# Patient Record
Sex: MALE | Race: WHITE | NOT HISPANIC OR LATINO | Employment: UNEMPLOYED | URBAN - METROPOLITAN AREA
[De-identification: names, ages, dates, MRNs, and addresses within clinical notes are randomized per-mention and may not be internally consistent; named-entity substitution may affect disease eponyms.]

---

## 2023-02-09 DIAGNOSIS — F84.0 AUTISTIC DISORDER, RESIDUAL STATE: Primary | ICD-10-CM

## 2023-02-10 ENCOUNTER — LAB (OUTPATIENT)
Dept: LAB | Facility: CLINIC | Age: 23
End: 2023-02-10
Payer: MEDICAID

## 2023-02-10 DIAGNOSIS — F84.0 AUTISTIC DISORDER, RESIDUAL STATE: ICD-10-CM

## 2023-02-10 PROCEDURE — 99000 SPECIMEN HANDLING OFFICE-LAB: CPT

## 2023-02-10 PROCEDURE — 36415 COLL VENOUS BLD VENIPUNCTURE: CPT

## 2023-02-10 PROCEDURE — 80164 ASSAY DIPROPYLACETIC ACD TOT: CPT | Mod: 90

## 2023-02-12 LAB
VALPROATE FREE MFR SERPL: 10 %
VALPROATE FREE SERPL-MCNC: 8 UG/ML
VALPROATE SERPL-MCNC: 82 UG/ML

## 2023-07-06 ENCOUNTER — HOSPITAL ENCOUNTER (EMERGENCY)
Facility: CLINIC | Age: 23
Discharge: HOME | End: 2023-07-06
Attending: EMERGENCY MEDICINE | Admitting: EMERGENCY MEDICINE
Payer: MEDICAID

## 2023-07-06 VITALS
HEART RATE: 56 BPM | OXYGEN SATURATION: 99 % | TEMPERATURE: 97.9 F | SYSTOLIC BLOOD PRESSURE: 135 MMHG | RESPIRATION RATE: 19 BRPM | DIASTOLIC BLOOD PRESSURE: 75 MMHG

## 2023-07-06 DIAGNOSIS — R46.89 AGGRESSIVE BEHAVIOR: ICD-10-CM

## 2023-07-06 PROCEDURE — 90791 PSYCH DIAGNOSTIC EVALUATION: CPT

## 2023-07-06 PROCEDURE — 99285 EMERGENCY DEPT VISIT HI MDM: CPT | Mod: 25

## 2023-07-06 ASSESSMENT — COLUMBIA-SUICIDE SEVERITY RATING SCALE - C-SSRS
TOTAL  NUMBER OF INTERRUPTED ATTEMPTS LIFETIME: NO
2. HAVE YOU ACTUALLY HAD ANY THOUGHTS OF KILLING YOURSELF?: NO
TOTAL  NUMBER OF ABORTED OR SELF INTERRUPTED ATTEMPTS LIFETIME: NO
ATTEMPT LIFETIME: NO
6. HAVE YOU EVER DONE ANYTHING, STARTED TO DO ANYTHING, OR PREPARED TO DO ANYTHING TO END YOUR LIFE?: NO
1. HAVE YOU WISHED YOU WERE DEAD OR WISHED YOU COULD GO TO SLEEP AND NOT WAKE UP?: NO

## 2023-07-06 ASSESSMENT — ACTIVITIES OF DAILY LIVING (ADL)
ADLS_ACUITY_SCORE: 35
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
## 2023-07-06 NOTE — ED NOTES
Writer RN called and spoke with staff member [place] at group [place] to give update.   Staff member reports also not being able to get a hold of the patients mother.
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
## 2023-07-06 NOTE — ED NOTES
Writer RN attempted to talk with pts mother who is his guardian ([phone]) and was not able to get a hold of her.   Writeeloisa GALINDO left a message.

## 2023-07-07 NOTE — ED NOTES
Spoke with pt's mother and group home staff which are all in agreement with the pt returning to the . Awaiting call back to see if they are able to pick the pt up.
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
## 2023-07-12 ENCOUNTER — HOSPITAL ENCOUNTER (EMERGENCY)
Facility: CLINIC | Age: 23
Discharge: LEFT BEFORE DISCHARGE | End: 2023-07-12
Admitting: EMERGENCY MEDICINE
Payer: MEDICAID

## 2023-07-12 VITALS
HEART RATE: 65 BPM | TEMPERATURE: 97.9 F | SYSTOLIC BLOOD PRESSURE: 115 MMHG | OXYGEN SATURATION: 98 % | DIASTOLIC BLOOD PRESSURE: 80 MMHG | RESPIRATION RATE: 18 BRPM

## 2023-07-12 PROCEDURE — 99281 EMR DPT VST MAYX REQ PHY/QHP: CPT

## 2023-07-12 NOTE — ED TRIAGE NOTES
Headache, back pain, bilateral ear pain, rectum pain, abd pain and pimple on right arm. Started yesterday. Anxiety is acting up per group home member which makes him a hypochondriac. Pt took PRN anxiety med before coming to ER.

## 2023-07-14 DIAGNOSIS — F84.0 AUTISTIC DISORDER: Primary | ICD-10-CM

## 2023-07-17 ENCOUNTER — LAB (OUTPATIENT)
Dept: LAB | Facility: CLINIC | Age: 23
End: 2023-07-17
Payer: MEDICAID

## 2023-07-17 DIAGNOSIS — F84.0 AUTISTIC DISORDER: ICD-10-CM

## 2023-07-17 PROCEDURE — 80165 DIPROPYLACETIC ACID FREE: CPT | Mod: 90

## 2023-07-17 PROCEDURE — 36415 COLL VENOUS BLD VENIPUNCTURE: CPT

## 2023-07-17 PROCEDURE — 80164 ASSAY DIPROPYLACETIC ACD TOT: CPT | Mod: 90

## 2023-07-17 PROCEDURE — 99000 SPECIMEN HANDLING OFFICE-LAB: CPT

## 2023-07-18 LAB
VALPROATE FREE MFR SERPL: 11 %
VALPROATE FREE SERPL-MCNC: 10 UG/ML
VALPROATE SERPL-MCNC: 92 UG/ML

## 2024-03-13 ENCOUNTER — OFFICE VISIT (OUTPATIENT)
Dept: FAMILY MEDICINE | Facility: CLINIC | Age: 24
End: 2024-03-13
Payer: MEDICAID

## 2024-03-13 VITALS
HEART RATE: 80 BPM | OXYGEN SATURATION: 96 % | BODY MASS INDEX: 21.3 KG/M2 | WEIGHT: 148.8 LBS | HEIGHT: 70 IN | SYSTOLIC BLOOD PRESSURE: 120 MMHG | TEMPERATURE: 97.4 F | DIASTOLIC BLOOD PRESSURE: 91 MMHG

## 2024-03-13 DIAGNOSIS — R53.83 OTHER FATIGUE: ICD-10-CM

## 2024-03-13 DIAGNOSIS — R63.0 DECREASED APPETITE: Primary | ICD-10-CM

## 2024-03-13 PROCEDURE — 99204 OFFICE O/P NEW MOD 45 MIN: CPT

## 2024-03-13 RX ORDER — DIVALPROEX SODIUM 500 MG/1
1000 TABLET, EXTENDED RELEASE ORAL
COMMUNITY
Start: 2022-12-29

## 2024-03-13 RX ORDER — OLANZAPINE 10 MG/1
TABLET ORAL
COMMUNITY
Start: 2024-02-23

## 2024-03-13 RX ORDER — HYDROXYZINE HYDROCHLORIDE 50 MG/1
50 TABLET, FILM COATED ORAL
COMMUNITY

## 2024-03-13 RX ORDER — CHOLECALCIFEROL (VITAMIN D3) 50 MCG
TABLET ORAL
COMMUNITY
Start: 2023-06-08

## 2024-03-13 RX ORDER — CHOLECALCIFEROL (VITAMIN D3) 50 MCG
TABLET ORAL
Status: CANCELLED | OUTPATIENT
Start: 2024-03-13

## 2024-03-13 SDOH — HEALTH STABILITY: PHYSICAL HEALTH: ON AVERAGE, HOW MANY DAYS PER WEEK DO YOU ENGAGE IN MODERATE TO STRENUOUS EXERCISE (LIKE A BRISK WALK)?: 0 DAYS

## 2024-03-13 ASSESSMENT — SOCIAL DETERMINANTS OF HEALTH (SDOH): HOW OFTEN DO YOU GET TOGETHER WITH FRIENDS OR RELATIVES?: ONCE A WEEK

## 2024-03-13 NOTE — PROGRESS NOTES
[facility]  Mellisa Hardin, OBDULIO CNP, Nurse Practitioner Primary Care  Mar 13, 2024      Preventative visit was not completed today due to patients acute nature.     Assessment & Plan     Decreased appetite  Patient was advised to go to ER. Reports decreased appetite and increased fatigue within the last couple of days. Was seen in urgent care on Monday for possible UTI and platelets 47 with slight increase in AST. Abdominal x-ray indicated moderate stool. Patient appears to be lethargic and stumbled while getting on exam table, abdomen tender throughout although difficult to assess due to mental capacity. Unable to rule out cholecystitis or appendicitis. Because of this, patient was advised to go to ER for further evaluation.      Other fatigue                Subjective   Fidencio is a 23 year old, presenting for the following:  Physical and Patient Request (Poor appetite)        3/13/2024     7:29 AM   Additional Questions   Roomed by delmi burns   Accompanied by [facility]- care taker for group home        Health Care Directive  Patient does not have a Health Care Directive or Living Will: Discussed advance care planning with patient; however, patient declined at this time.    HPI  Hasn't taken trazodone the last two nights, has woken up a couple of times in the night. Has been on trazadone for a number of months, increased shaking with increased dose but shaking has improved with decrease in trazodone.      Has been taking antibiotics Monday night, will take for the last couple of days. Has a little more energy since urgent care visit but continues to be lethargic. Gagging started a little over a week ago and has significantly decreased appetite. Shaking has reduced since trazedone reduced.     ED/UC Followup:    Facility:  [facility]  Date of visit: 3/11/24  Reason for visit: loss of appetite, fatigue, shaking  Current Status: still not eating well, shaking has reduced.  "    Reviewed and updated as needed this visit by Provider                             Objective    Exam  BP (!) 120/91 (BP Location: Right arm, Patient Position: Sitting, Cuff Size: Adult Regular)   Pulse 80   Temp 97.4  F (36.3  C) (Temporal)   Ht 1.778 m (5' 10\")   Wt 67.5 kg (148 lb 12.8 oz)   SpO2 96%   BMI 21.35 kg/m     Estimated body mass index is 21.35 kg/m  as calculated from the following:    Height as of this encounter: 1.778 m (5' 10\").    Weight as of this encounter: 67.5 kg (148 lb 12.8 oz).    Physical Exam  GENERAL: alert and fatigued  EYES: PERRL and slight yellowing of sclera   RESP: lungs clear to auscultation - no rales, rhonchi or wheezes  CV: regular rate and rhythm, normal S1 S2, no S3 or S4, no murmur, click or rub, no peripheral edema  ABDOMEN: tenderness throughout and bowel sounds normal  NEURO: Normal strength and tone, mentation intact and speech normal  PSYCH: mentation appears normal and fatigued        Signed Electronically by: OBDULIO Cameron CNP    "

## 2024-03-13 NOTE — COMMUNITY RESOURCES LIST (ENGLISH)
March 13, 2024           YOUR PERSONALIZED LIST OF SERVICES & PROGRAMS           & RECREATION    Sports      [employer] [place] - Sports clubs and recreational activities  [address] [place] [address] (Distance: 1.4 miles)  Phone: [phone]  Website: [email or web address]  Language: English  Fee: Self pay  Accessibility: [place] accessible      Park [employer] - Sports clubs and recreational activities - [place] [employer] [place]  [address] [place] [address] (Distance: 2.4 miles)  Phone: [phone]  Language: English  Fee: Free, Self pay  Accessibility: [place] accessible      [employer] BASEBALL AND SOFTBALL  Website: [email or web address].[email or web address]    Classes/Groups      Park & Recreation Board - Gym or workout facility - [place] [employer] [facility] [place]  [address] [place] [address] (Distance: 9.1 miles)  Language: English  Fee: Free, Self pay  Accessibility: [place] accessible      Park & Recreation Board - Gym or workout facility - [place] [employer] [facility] [place]  [address] [place] [address] (Distance: 11.9 miles)  Phone: [phone]  Language: English, Fijian  Fee: Free, Self pay  Accessibility: Translation services      Workouts - Exercise Class/At Home Workouts  Website: https://homeworkouts.org/  Language: English               IMPORTANT NUMBERS & WEBSITES        Emergency Services  911  .   United Way  211 http://211unitedway.org  .   Poison Control  (916) 747-9265 http://[place]poison.org http://[place]poison.org  .     Suicide and Crisis Lifeline  988 http://988lifeline.org  .   Childhelp National Child Abuse Hotline  197.649.2518 http://Childhelphotline.org   .   National Sexual Assault Hotline  (288) 166-7618 (HOPE) http://Rainn.org   .     National Runaway  Safeline  (535) 275-6099 (RUNAWAY) http://[employer] Laboratoriesru"[employer].org  .   Pregnancy & Postpartum Support  Call/text 806-676-8051  [place]: http://ppsupportmn.org  [place]: http://psichapters.com/wi  .   Substance Abuse National Helpline ([facility])  425-740-HELP (4000) http://Findtreatment.gov   .                DISCLAIMER: [employer] does not endorse any service providers mentioned in this resource list. [employer] does not guarantee that the services mentioned in this resource list will be available to you or will improve your health or wellness.    [facility]

## 2024-10-04 DIAGNOSIS — F84.0 AUTISTIC DISORDER: Primary | ICD-10-CM

## 2025-07-02 ENCOUNTER — OFFICE VISIT (OUTPATIENT)
Dept: FAMILY MEDICINE | Facility: CLINIC | Age: 25
End: 2025-07-02
Payer: MEDICAID

## 2025-07-02 VITALS
RESPIRATION RATE: 18 BRPM | DIASTOLIC BLOOD PRESSURE: 92 MMHG | OXYGEN SATURATION: 97 % | HEART RATE: 66 BPM | SYSTOLIC BLOOD PRESSURE: 126 MMHG | WEIGHT: 166.6 LBS | HEIGHT: 70 IN | BODY MASS INDEX: 23.85 KG/M2

## 2025-07-02 DIAGNOSIS — Z13.29 SCREENING FOR ENDOCRINE, METABOLIC, AND IMMUNITY DISORDER: ICD-10-CM

## 2025-07-02 DIAGNOSIS — Z13.0 SCREENING FOR ENDOCRINE, METABOLIC, AND IMMUNITY DISORDER: ICD-10-CM

## 2025-07-02 DIAGNOSIS — F84.0 AUTISTIC DISORDER, RESIDUAL STATE: ICD-10-CM

## 2025-07-02 DIAGNOSIS — Z00.00 ROUTINE GENERAL MEDICAL EXAMINATION AT A HEALTH CARE FACILITY: Primary | ICD-10-CM

## 2025-07-02 DIAGNOSIS — R03.0 ELEVATED BP WITHOUT DIAGNOSIS OF HYPERTENSION: ICD-10-CM

## 2025-07-02 DIAGNOSIS — Z13.220 SCREENING FOR HYPERLIPIDEMIA: ICD-10-CM

## 2025-07-02 DIAGNOSIS — L29.9 EAR ITCH: ICD-10-CM

## 2025-07-02 DIAGNOSIS — F84.0 AUTISM DISORDER: ICD-10-CM

## 2025-07-02 DIAGNOSIS — Z13.228 SCREENING FOR ENDOCRINE, METABOLIC, AND IMMUNITY DISORDER: ICD-10-CM

## 2025-07-02 DIAGNOSIS — R09.89 RUNNY NOSE: ICD-10-CM

## 2025-07-02 LAB
ALBUMIN SERPL BCG-MCNC: 4.4 G/DL (ref 3.5–5.2)
ALP SERPL-CCNC: 70 U/L (ref 40–150)
ALT SERPL W P-5'-P-CCNC: 81 U/L (ref 0–70)
ANION GAP SERPL CALCULATED.3IONS-SCNC: 12 MMOL/L (ref 7–15)
AST SERPL W P-5'-P-CCNC: 72 U/L (ref 0–45)
BILIRUB SERPL-MCNC: 0.8 MG/DL
BUN SERPL-MCNC: 16.2 MG/DL (ref 6–20)
CALCIUM SERPL-MCNC: 9.9 MG/DL (ref 8.8–10.4)
CHLORIDE SERPL-SCNC: 99 MMOL/L (ref 98–107)
CHOLEST SERPL-MCNC: 197 MG/DL
CREAT SERPL-MCNC: 1.2 MG/DL (ref 0.67–1.17)
EGFRCR SERPLBLD CKD-EPI 2021: 86 ML/MIN/1.73M2
ERYTHROCYTE [DISTWIDTH] IN BLOOD BY AUTOMATED COUNT: 13.1 % (ref 10–15)
EST. AVERAGE GLUCOSE BLD GHB EST-MCNC: 97 MG/DL
FASTING STATUS PATIENT QL REPORTED: ABNORMAL
FASTING STATUS PATIENT QL REPORTED: ABNORMAL
GLUCOSE SERPL-MCNC: 79 MG/DL (ref 70–99)
HBA1C MFR BLD: 5 % (ref 0–5.6)
HCO3 SERPL-SCNC: 27 MMOL/L (ref 22–29)
HCT VFR BLD AUTO: 42.3 % (ref 40–53)
HDLC SERPL-MCNC: 47 MG/DL
HGB BLD-MCNC: 14.8 G/DL (ref 13.3–17.7)
LDLC SERPL CALC-MCNC: 124 MG/DL
MCH RBC QN AUTO: 30.1 PG (ref 26.5–33)
MCHC RBC AUTO-ENTMCNC: 35 G/DL (ref 31.5–36.5)
MCV RBC AUTO: 86 FL (ref 78–100)
NONHDLC SERPL-MCNC: 150 MG/DL
PLATELET # BLD AUTO: 165 10E3/UL (ref 150–450)
POTASSIUM SERPL-SCNC: 3.6 MMOL/L (ref 3.4–5.3)
PROT SERPL-MCNC: 7.2 G/DL (ref 6.4–8.3)
RBC # BLD AUTO: 4.91 10E6/UL (ref 4.4–5.9)
SODIUM SERPL-SCNC: 138 MMOL/L (ref 135–145)
TRIGL SERPL-MCNC: 132 MG/DL
TSH SERPL DL<=0.005 MIU/L-ACNC: 3.35 UIU/ML (ref 0.3–4.2)
VALPROATE SERPL-MCNC: 84.9 UG/ML (ref 50–100)
VIT D+METAB SERPL-MCNC: 37 NG/ML (ref 20–50)
WBC # BLD AUTO: 6.2 10E3/UL (ref 4–11)

## 2025-07-02 PROCEDURE — 80053 COMPREHEN METABOLIC PANEL: CPT | Performed by: FAMILY MEDICINE

## 2025-07-02 PROCEDURE — 3044F HG A1C LEVEL LT 7.0%: CPT | Performed by: FAMILY MEDICINE

## 2025-07-02 PROCEDURE — 82306 VITAMIN D 25 HYDROXY: CPT | Performed by: FAMILY MEDICINE

## 2025-07-02 PROCEDURE — 84443 ASSAY THYROID STIM HORMONE: CPT | Performed by: FAMILY MEDICINE

## 2025-07-02 PROCEDURE — 3074F SYST BP LT 130 MM HG: CPT | Performed by: FAMILY MEDICINE

## 2025-07-02 PROCEDURE — 99395 PREV VISIT EST AGE 18-39: CPT | Performed by: FAMILY MEDICINE

## 2025-07-02 PROCEDURE — 3080F DIAST BP >= 90 MM HG: CPT | Performed by: FAMILY MEDICINE

## 2025-07-02 PROCEDURE — 36415 COLL VENOUS BLD VENIPUNCTURE: CPT | Performed by: FAMILY MEDICINE

## 2025-07-02 PROCEDURE — 99213 OFFICE O/P EST LOW 20 MIN: CPT | Mod: 25 | Performed by: FAMILY MEDICINE

## 2025-07-02 PROCEDURE — 80164 ASSAY DIPROPYLACETIC ACD TOT: CPT | Performed by: FAMILY MEDICINE

## 2025-07-02 PROCEDURE — 80061 LIPID PANEL: CPT | Performed by: FAMILY MEDICINE

## 2025-07-02 PROCEDURE — 85027 COMPLETE CBC AUTOMATED: CPT | Performed by: FAMILY MEDICINE

## 2025-07-02 PROCEDURE — 83036 HEMOGLOBIN GLYCOSYLATED A1C: CPT | Performed by: FAMILY MEDICINE

## 2025-07-02 RX ORDER — GUANFACINE 1 MG/1
1 TABLET ORAL 3 TIMES DAILY
COMMUNITY
Start: 2025-06-12

## 2025-07-02 SDOH — HEALTH STABILITY: PHYSICAL HEALTH: ON AVERAGE, HOW MANY DAYS PER WEEK DO YOU ENGAGE IN MODERATE TO STRENUOUS EXERCISE (LIKE A BRISK WALK)?: 1 DAY

## 2025-07-02 ASSESSMENT — SOCIAL DETERMINANTS OF HEALTH (SDOH): HOW OFTEN DO YOU GET TOGETHER WITH FRIENDS OR RELATIVES?: ONCE A WEEK

## 2025-07-02 NOTE — LETTER
July 2, 2025      Fidencio Vergara  [address] [place] [address] [place] [address]        To Whom It May Concern:    Fidencio Vergara was seen in our clinic today 07/02/25 for an annual physical. He is healthy and has no restriction and may participate in camp       Sincerely,      Danii Cleveland MD    Electronically signed

## 2025-07-02 NOTE — PROGRESS NOTES
Preventive Care Visit  [facility]  Danii Cleveland MD, Family Medicine  Jul 2, 2025      Assessment & Plan     Routine general medical examination at a health care facility  Routine history and physical, updated in EMR.  Lab work obtained today.  Follow-up yearly for annual physical    Autism disorder  Medications per psychology.  Stable.  Lives in a group home  - Comprehensive metabolic panel (BMP + Alb, Alk Phos, ALT, AST, Total. Bili, TP)  - TSH with free T4 reflex  - Vitamin D Deficiency  - Comprehensive metabolic panel (BMP + Alb, Alk Phos, ALT, AST, Total. Bili, TP)  - TSH with free T4 reflex  - Vitamin D Deficiency    Ear itch  Runny nose  Exam unremarkable.  Recommended over-the-counter Zyrtec 10 mg daily.  Follow-up as needed    Screening for hyperlipidemia  - Lipid panel reflex to direct LDL Fasting  - Lipid panel reflex to direct LDL Fasting    Screening for endocrine, metabolic, and immunity disorder  - Comprehensive metabolic panel (BMP + Alb, Alk Phos, ALT, AST, Total. Bili, TP)  - CBC with platelets  - Hemoglobin A1c  - TSH with free T4 reflex  - Vitamin D Deficiency  - Comprehensive metabolic panel (BMP + Alb, Alk Phos, ALT, AST, Total. Bili, TP)  - CBC with platelets  - Hemoglobin A1c  - TSH with free T4 reflex  - Vitamin D Deficiency    Autistic disorder, residual state  Stable.  - Valproic acid    Elevated BP without diagnosis of hypertension  Advised checking BP daily for 2 weeks and let me know.   Advised decreased salt in diet and increase in physical activities       Patient has been advised of split billing requirements and indicates understanding: Yes      Reviewed preventive health counseling, as reflected in patient instructions  Counseling  Appropriate preventive services were addressed with this patient via screening, questionnaire, or discussion as appropriate for fall prevention, nutrition, physical activity, Tobacco-use cessation, social engagement,  weight loss and cognition.  Checklist reviewing preventive services available has been given to the patient.  Reviewed patient's diet, addressing concerns and/or questions.   He is at risk for lack of exercise and has been provided with information to increase physical activity for the benefit of his well-being.   The patient was instructed to see the dentist every 6 months.         Subjective   Fidencio is a 24 year old, presenting for the following:  Physical (Pt is fasting today, mentioned that bilateral ears have been bothersome for about a week. )        7/2/2025     7:17 AM   Additional Questions   Roomed by Katy Short CMA   Accompanied by Caregiver          HPI  Chief Complaint   Patient presents with    Physical     Pt is fasting today, mentioned that bilateral ears have been bothersome for about a week.      Seeing psychiatry at [facility] for all his meds.  Going to [employer]         Advance Care Planning    Discussed advance care planning with patient; informed AVS has link to [employer].        7/2/2025   General Health   How would you rate your overall physical health? Good   Feel stress (tense, anxious, or unable to sleep) Only a little   (!) STRESS CONCERN      7/2/2025   Nutrition   Three or more servings of calcium each day? Yes   Diet: Regular (no restrictions)   How many servings of fruit and vegetables per day? (!) 2-3   How many sweetened beverages each day? 0-1         7/2/2025   Exercise   Days per week of moderate/strenous exercise 1 day   (!) EXERCISE CONCERN      7/2/2025   Social Factors   Frequency of gathering with friends or relatives Once a week   Worry food won't last until get money to buy more No   Food not last or not have enough money for food? No   Do you have housing? (Housing is defined as stable permanent housing and does not include staying outside in a car, in a tent, in an abandoned building, in an overnight shelter, or couch-surfing.) Yes   Are you worried  "about losing your housing? No   Lack of transportation? No   Unable to get utilities (heat,electricity)? No         7/2/2025   Dental   Dentist two times every year? (!) NO         Today's PHQ-2 Score:       7/2/2025     7:13 AM   PHQ-2 ( 1999 [employer])   PHQ-2 Score Incomplete           7/2/2025   Substance Use   Alcohol more than 3/day or more than 7/wk Not Applicable   Do you use any other substances recreationally? No     Social History     Tobacco Use    Smoking status: Never    Smokeless tobacco: Never   Vaping Use    Vaping status: Never Used   Substance Use Topics    Alcohol use: No    Drug use: No           7/2/2025   STI Screening   New sexual partner(s) since last STI/HIV test? No         7/2/2025   Contraception/Family Planning   Questions about contraception or family planning No        Reviewed and updated as needed this visit by Provider                    Past Medical History:   Diagnosis Date    ADHD (attention deficit hyperactivity disorder)     Autism      History reviewed. No pertinent surgical history.  OB History   No obstetric history on file.     Lab work is in process      Review of Systems  Constitutional, neuro, ENT, endocrine, pulmonary, cardiac, gastrointestinal, genitourinary, musculoskeletal, integument and psychiatric systems are negative, except as otherwise noted.     Objective    Exam  BP (!) 130/95 (BP Location: Left arm, Patient Position: Sitting, Cuff Size: Adult Regular)   Pulse 65   Resp 18   Ht 1.778 m (5' 10\")   Wt 75.6 kg (166 lb 9.6 oz)   SpO2 97%   BMI 23.90 kg/m     Estimated body mass index is 23.9 kg/m  as calculated from the following:    Height as of this encounter: 1.778 m (5' 10\").    Weight as of this encounter: 75.6 kg (166 lb 9.6 oz).    Physical Exam  GENERAL: alert and no distress  EYES: Eyes grossly normal to inspection, PERRL and conjunctivae and sclerae normal  HENT: ear canals and TM's normal, nose and mouth without ulcers or lesions  NECK: no " adenopathy, no asymmetry, masses, or scars  RESP: lungs clear to auscultation - no rales, rhonchi or wheezes  CV: regular rate and rhythm, normal S1 S2, no S3 or S4, no murmur, click or rub, no peripheral edema  MS: no gross musculoskeletal defects noted, no edema  SKIN: no suspicious lesions or rashes  NEURO: Normal strength and tone, mentation intact and speech normal  PSYCH: mentation appears normal, affect normal/bright        Signed Electronically by: Danii Cleveland MD

## 2025-07-02 NOTE — PATIENT INSTRUCTIONS
You can take Zyrtec 10 mg daily for itchy ears. If not better please follow up.     Patient Education   Preventive Care Advice   This is general advice given by our system to help you stay healthy. However, your care team may have specific advice just for you. Please talk to your care team about your preventive care needs.  Nutrition  Eat 5 or more servings of fruits and vegetables each day.  Try wheat bread, brown rice and whole grain pasta (instead of white bread, rice, and pasta).  Get enough calcium and vitamin D. Check the label on foods and aim for 100% of the RDA (recommended daily allowance).  Lifestyle  Exercise at least 150 minutes each week  (30 minutes a day, 5 days a week).  Do muscle strengthening activities 2 days a week. These help control your weight and prevent disease.  No smoking.  Wear sunscreen to prevent skin cancer.  Have a dental exam and cleaning every 6 months.  Yearly exams  See your health care team every year to talk about:  Any changes in your health.  Any medicines your care team has prescribed.  Preventive care, family planning, and ways to prevent chronic diseases.  Shots (vaccines)   HPV shots (up to age 26), if you've never had them before.  Hepatitis B shots (up to age 59), if you've never had them before.  COVID-19 shot: Get this shot when it's due.  Flu shot: Get a flu shot every year.  Tetanus shot: Get a tetanus shot every 10 years.  Pneumococcal, hepatitis A, and RSV shots: Ask your care team if you need these based on your risk.  Shingles shot (for age 50 and up)  General health tests  Diabetes screening:  Starting at age 35, Get screened for diabetes at least every 3 years.  If you are younger than age 35, ask your care team if you should be screened for diabetes.  Cholesterol test: At age 39, start having a cholesterol test every 5 years, or more often if advised.  Bone density scan (DEXA): At age 50, ask your care team if you should have this scan for osteoporosis  (brittle bones).  Hepatitis C: Get tested at least once in your life.  STIs (sexually transmitted infections)  Before age 24: Ask your care team if you should be screened for STIs.  After age 24: Get screened for STIs if you're at risk. You are at risk for STIs (including HIV) if:  You are sexually active with more than one person.  You don't use condoms every time.  You or a partner was diagnosed with a sexually transmitted infection.  If you are at risk for HIV, ask about PrEP medicine to prevent HIV.  Get tested for HIV at least once in your life, whether you are at risk for HIV or not.  Cancer screening tests  Cervical cancer screening: If you have a cervix, begin getting regular cervical cancer screening tests starting at age 21.  Breast cancer scan (mammogram): If you've ever had breasts, begin having regular mammograms starting at age 40. This is a scan to check for breast cancer.  Colon cancer screening: It is important to start screening for colon cancer at age 45.  Have a colonoscopy test every 10 years (or more often if you're at risk) Or, ask your provider about stool tests like a FIT test every year or Cologuard test every 3 years.  To learn more about your testing options, visit:   .  For help making a decision, visit:   https://bit.ly/ri95067.  Prostate cancer screening test: If you have a prostate, ask your care team if a prostate cancer screening test (PSA) at age 55 is right for you.  Lung cancer screening: If you are a current or former smoker ages 50 to 80, ask your care team if ongoing lung cancer screenings are right for you.  For informational purposes only. Not to replace the advice of your health care provider. Copyright   2023 [employer]. All rights reserved. Clinically reviewed by the [facility] Transitions Program. [employer] [id number] - REV 01/24.  Eating Healthy Foods: Care Instructions  With every meal, you can make healthy food choices. Try to eat a variety of  "fruits, vegetables, whole grains, lean proteins, and low-fat dairy products. This can help you get the right balance of nutrients, including vitamins and minerals. Small changes add up over time. You can start by adding one healthy food to your meals each day.    Try to make half your plate fruits and vegetables, one-fourth whole grains, and one-fourth lean proteins. Try including dairy with your meals.   Eat more fruits and vegetables. Try to have them with most meals and snacks.   Foods for healthy eating        Fruits   These can be fresh, frozen, canned, or dried.  Try to choose whole fruit rather than fruit juice.  Eat a variety of colors.        Vegetables   These can be fresh, frozen, canned, or dried.  Beans, peas, and lentils count too.        Whole grains   Choose whole-grain breads, cereals, and noodles.  Try brown rice.        Lean proteins   These can include lean meat, poultry, fish, and eggs.  You can also have tofu, beans, peas, lentils, nuts, and seeds.        Dairy   Try milk, yogurt, and cheese.  Choose low-fat or fat-free when you can.  If you need to, use lactose-free milk or fortified plant-based milk products, such as soy milk.        Water   Drink water when you're thirsty.  Limit sugar-sweetened drinks, including soda, fruit drinks, and sports drinks.  Where can you learn more?  Go to https://www.[employer].net/patiented  Enter T756 in the search box to learn more about \"Eating Healthy Foods: Care Instructions.\"  Current as of: October 7, 2024  Content Version: 14.5 2024-2025 [employer].   Care instructions adapted under license by your healthcare professional. If you have questions about a medical condition or this instruction, always ask your healthcare professional. [employer] disclaims any warranty or liability for your use of this information.       "